# Patient Record
(demographics unavailable — no encounter records)

---

## 2024-12-02 NOTE — REVIEW OF SYSTEMS
[Patient Intake Form Reviewed] : Patient intake form was reviewed [FreeTextEntry2] : Negative except per HPI/Intake Form\par    [FreeTextEntry1] : Negative except per HPI/Intake Form\par

## 2024-12-02 NOTE — PHYSICAL EXAM
[No Acute Distress] : no acute distress [Well Nourished] : well nourished [Well Developed] : well developed [Well-Appearing] : well-appearing [Normal Sclera/Conjunctiva] : normal sclera/conjunctiva [PERRL] : pupils equal round and reactive to light [EOMI] : extraocular movements intact [Normal Outer Ear/Nose] : the outer ears and nose were normal in appearance [Normal Oropharynx] : the oropharynx was normal [No JVD] : no jugular venous distention [No Lymphadenopathy] : no lymphadenopathy [Supple] : supple [Thyroid Normal, No Nodules] : the thyroid was normal and there were no nodules present [No Respiratory Distress] : no respiratory distress  [No Accessory Muscle Use] : no accessory muscle use [Clear to Auscultation] : lungs were clear to auscultation bilaterally [Normal Rate] : normal rate  [Regular Rhythm] : with a regular rhythm [Normal S1, S2] : normal S1 and S2 [No Murmur] : no murmur heard [Normal] : normal rate, regular rhythm, normal S1 and S2 and no murmur heard [No Carotid Bruits] : no carotid bruits [No Abdominal Bruit] : a ~M bruit was not heard ~T in the abdomen [No Varicosities] : no varicosities [Pedal Pulses Present] : the pedal pulses are present [No Edema] : there was no peripheral edema [No Palpable Aorta] : no palpable aorta [No Extremity Clubbing/Cyanosis] : no extremity clubbing/cyanosis [Soft] : abdomen soft [Non Tender] : non-tender [Non-distended] : non-distended [No Masses] : no abdominal mass palpated [No HSM] : no HSM [Normal Bowel Sounds] : normal bowel sounds [No CVA Tenderness] : no CVA  tenderness [No Spinal Tenderness] : no spinal tenderness [No Joint Swelling] : no joint swelling [Grossly Normal Strength/Tone] : grossly normal strength/tone [No Rash] : no rash [Coordination Grossly Intact] : coordination grossly intact [No Focal Deficits] : no focal deficits [Normal Gait] : normal gait [Deep Tendon Reflexes (DTR)] : deep tendon reflexes were 2+ and symmetric [Normal Affect] : the affect was normal [Normal Insight/Judgement] : insight and judgment were intact

## 2024-12-02 NOTE — HEALTH RISK ASSESSMENT
[Good] : ~his/her~  mood as  good [Yes] : Yes [2 - 4 times a month (2 pts)] : 2-4 times a month (2 points) [Any fall with injury in past year] : Patient reported fall with injury in the past year [0] : 2) Feeling down, depressed, or hopeless: Not at all (0) [Former] : Former [5-9] : 5-9 [> 15 Years] : > 15 Years [Patient declined PAP Smear] : Patient declined PAP Smear [HIV Test offered] : HIV Test offered [Hepatitis C test offered] : Hepatitis C test offered [None] : None [With Family] : lives with family [Retired] : retired [College] : College [] :  [# Of Children ___] : has [unfilled] children [Fully functional (bathing, dressing, toileting, transferring, walking, feeding)] : Fully functional (bathing, dressing, toileting, transferring, walking, feeding) [Fully functional (using the telephone, shopping, preparing meals, housekeeping, doing laundry, using] : Fully functional and needs no help or supervision to perform IADLs (using the telephone, shopping, preparing meals, housekeeping, doing laundry, using transportation, managing medications and managing finances) [Reports changes in hearing] : Reports changes in hearing [Reports normal functional visual acuity (ie: able to read med bottle)] : Reports normal functional visual acuity [Smoke Detector] : smoke detector [Carbon Monoxide Detector] : carbon monoxide detector [Seat Belt] :  uses seat belt [Sunscreen] : uses sunscreen [Time Spent: ___ Minutes] : I spent [unfilled] minutes performing a depression screening for this patient. [de-identified] : opth.,  [de-identified] : bike riding, [place]; walking [de-identified] : heart healthy [de-identified] : [place] [[id number]] : 0 [Change in mental status noted] : No change in mental status noted [High Risk Behavior] : no high risk behavior [Reports changes in vision] : Reports no changes in vision [Reports changes in dental health] : Reports no changes in dental health [Travel to Developing Areas] : does not  travel to developing areas [TB Exposure] : is not being exposed to tuberculosis [MammogramDate] : 12/24 [MammogramComments] : 12/25 [BoneDensityDate] : 3/24 [BoneDensityComments] : osteopenia [ColonoscopyDate] : 10/21 [ColonoscopyComments] : 10/26 [de-identified] : difficulty with bkgd. noise [de-identified] : planning to see retina specialist [AdvancecareDate] : 08/10

## 2024-12-02 NOTE — PLAN
[FreeTextEntry1] : EKG - HTN - sinus bradycardia without ischemic changes  #HLD  lifestyle modification discussed at length repeat lipid profile 3 months continue statin as prescribed    #HTN Patient with history of hypertension.  BP today elevated [ ] SWITCH enalapril > telmisartan 40 daily Patient to continue current regimen as prescribed. Patient instructed to continue monitoring blood pressure at home and to keep a journal. Will continue to monitor patient's bp and adjust his hypertensive regimen as needed.   [ ] CT chest as of 3/2024 - few pulm nodules - repeat due 3/2025

## 2024-12-02 NOTE — HISTORY OF PRESENT ILLNESS
[FreeTextEntry1] : Patient presents for comprehensive medical evaluation today.  [de-identified] : Patient presents today for follow-up of chronic medical conditions. HTN, HLD   Has been struggling with sleep -  BP remains elevated - unsure if BP monitor is accurate Doing ghazal chi regularly and has been helping her  recently passed away - has been grieving but ultimately feels that she is coping the best way she can for now Keeping busy with ghazal chi and meditation